# Patient Record
Sex: MALE | Race: WHITE
[De-identification: names, ages, dates, MRNs, and addresses within clinical notes are randomized per-mention and may not be internally consistent; named-entity substitution may affect disease eponyms.]

---

## 2023-09-01 ENCOUNTER — HOSPITAL ENCOUNTER (EMERGENCY)
Age: 78
Discharge: HOME | End: 2023-09-01
Payer: OTHER GOVERNMENT

## 2023-09-01 VITALS
HEART RATE: 91 BPM | DIASTOLIC BLOOD PRESSURE: 69 MMHG | OXYGEN SATURATION: 95 % | RESPIRATION RATE: 15 BRPM | SYSTOLIC BLOOD PRESSURE: 104 MMHG

## 2023-09-01 VITALS
BODY MASS INDEX: 33 KG/M2 | SYSTOLIC BLOOD PRESSURE: 133 MMHG | HEART RATE: 110 BPM | OXYGEN SATURATION: 96 % | TEMPERATURE: 97.7 F | DIASTOLIC BLOOD PRESSURE: 77 MMHG | RESPIRATION RATE: 20 BRPM

## 2023-09-01 VITALS
HEART RATE: 90 BPM | SYSTOLIC BLOOD PRESSURE: 119 MMHG | DIASTOLIC BLOOD PRESSURE: 75 MMHG | TEMPERATURE: 98.24 F | RESPIRATION RATE: 20 BRPM | OXYGEN SATURATION: 95 %

## 2023-09-01 VITALS
HEART RATE: 120 BPM | SYSTOLIC BLOOD PRESSURE: 133 MMHG | RESPIRATION RATE: 20 BRPM | OXYGEN SATURATION: 97 % | DIASTOLIC BLOOD PRESSURE: 77 MMHG

## 2023-09-01 VITALS
RESPIRATION RATE: 23 BRPM | OXYGEN SATURATION: 95 % | DIASTOLIC BLOOD PRESSURE: 74 MMHG | HEART RATE: 98 BPM | SYSTOLIC BLOOD PRESSURE: 150 MMHG

## 2023-09-01 VITALS — DIASTOLIC BLOOD PRESSURE: 66 MMHG | OXYGEN SATURATION: 95 % | HEART RATE: 95 BPM | SYSTOLIC BLOOD PRESSURE: 118 MMHG

## 2023-09-01 VITALS — HEART RATE: 110 BPM

## 2023-09-01 DIAGNOSIS — M25.511: Primary | ICD-10-CM

## 2023-09-01 DIAGNOSIS — M10.9: ICD-10-CM
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
## 2023-09-01 DIAGNOSIS — E78.00: ICD-10-CM

## 2023-09-01 DIAGNOSIS — Z87.891: ICD-10-CM

## 2023-09-01 PROCEDURE — 73070 X-RAY EXAM OF ELBOW: CPT

## 2023-09-01 PROCEDURE — 99285 EMERGENCY DEPT VISIT HI MDM: CPT

## 2023-09-01 PROCEDURE — 73060 X-RAY EXAM OF HUMERUS: CPT

## 2023-09-01 PROCEDURE — 73030 X-RAY EXAM OF SHOULDER: CPT

## 2023-09-01 PROCEDURE — 73660 X-RAY EXAM OF TOE(S): CPT

## 2023-09-01 NOTE — PC.NURSE
Rounded on pt, he reports a decrease in pain, educated we are waiting on xr reports. Pt denied any needs at this time.

## 2023-11-28 ENCOUNTER — OFFICE VISIT (OUTPATIENT)
Dept: ORTHOPEDIC SURGERY | Facility: CLINIC | Age: 78
End: 2023-11-28
Payer: OTHER GOVERNMENT

## 2023-11-28 VITALS
BODY MASS INDEX: 30.52 KG/M2 | SYSTOLIC BLOOD PRESSURE: 118 MMHG | WEIGHT: 218 LBS | HEIGHT: 71 IN | DIASTOLIC BLOOD PRESSURE: 76 MMHG

## 2023-11-28 DIAGNOSIS — M25.511 RIGHT SHOULDER PAIN, UNSPECIFIED CHRONICITY: Primary | ICD-10-CM

## 2023-11-28 PROCEDURE — 99204 OFFICE O/P NEW MOD 45 MIN: CPT | Performed by: ORTHOPAEDIC SURGERY

## 2023-11-28 RX ORDER — AMLODIPINE AND BENAZEPRIL HYDROCHLORIDE 5; 40 MG/1; MG/1
1 CAPSULE ORAL DAILY
COMMUNITY

## 2023-11-28 RX ORDER — ISOSORBIDE MONONITRATE 30 MG/1
30 TABLET, EXTENDED RELEASE ORAL DAILY
COMMUNITY

## 2023-11-28 RX ORDER — ATORVASTATIN CALCIUM 80 MG/1
80 TABLET, FILM COATED ORAL DAILY
COMMUNITY

## 2023-11-28 RX ORDER — NIFEDIPINE 30 MG/1
30 TABLET, EXTENDED RELEASE ORAL DAILY
COMMUNITY

## 2023-11-28 RX ORDER — ALLOPURINOL 100 MG/1
100 TABLET ORAL DAILY
COMMUNITY

## 2023-11-28 RX ORDER — FLUOXETINE HYDROCHLORIDE 20 MG/1
20 CAPSULE ORAL DAILY
COMMUNITY

## 2023-11-28 NOTE — PROGRESS NOTES
[facility] [place] - Musa Dowling MD    Office Visit       Patient Name: Michel Hall    Chief Complaint:   Chief Complaint   Patient presents with    Right Shoulder - Pain       Referring Physician: Rosa Carson PA  - I appreciate the referral      History of Present Illness:   Michel Hall is a 78 y.o. male who presents with right body part: shoulder Reason: pain.  Onset:Onset: mechanical fall. The issue has been ongoing for 7 month(s). Pain is a 6/10 on the pain scale. Pain is described as Pain Characterization: dull and aching. Associated symptoms include Symptoms: pain and stiffness. The pain is worse with sleeping, lying on affected side, rising from seated position, and any movement of the joint; nothing improves the pain. Previous treatments have included: steroid injection (last injection 08/2023). I have reviewed the patient's history of present illness as noted/entered above.    I have reviewed the patient's past medical history, surgical history, social history, family history, medications, and allergies as noted in the electronic medical record and as noted/entered.  I have reviewed the patient's review of systems as noted/enter and updated as noted in the patient's HPI.      RIGHT SHOULDER PAIN  Fall, worsening 7 months prior  Prior injecxtion 8/2023  Fall 6/13/2023 -- [facility] -- contralateral left shoulder xrays at that time    [facility] that [facility] could benefit from [facility] replacement he is unsure if he wants to proceed with that.  [facility] at least potentially April or May 2024.  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  Has bayonet's, uniforms, hats, helmets for multiple areas    Uses a rolling  walker    78 y.o. male  Body mass index is 30.4 kg/m².    Subjective   Subjective      Review of Systems   Constitutional: Negative.  Negative for chills, fatigue and fever.   HENT: Negative.  Negative for congestion and dental problem.    Eyes: Negative.  Negative for blurred vision.   Respiratory: Negative.  Negative for shortness of breath.    Cardiovascular: Negative.  Negative for leg swelling.   Gastrointestinal: Negative.  Negative for abdominal pain.   Endocrine: Negative.  Negative for polyuria.   Genitourinary: Negative.  Negative for difficulty urinating.   Musculoskeletal:  Positive for arthralgias.   Skin: Negative.    Allergic/Immunologic: Negative.    Neurological: Negative.    Hematological: Negative.  Negative for adenopathy.   Psychiatric/Behavioral: Negative.  Negative for behavioral problems.         Past Medical History:   Past Medical History:   Diagnosis Date    Rotator cuff syndrome April 2023       Past Surgical History:   Past Surgical History:   Procedure Laterality Date    ACHILLES TENDON SURGERY  2010    BACK SURGERY  2012    HAND SURGERY  1970       Family History: History reviewed. No pertinent family history.    Social History:   Social History     Socioeconomic History    Marital status:    Tobacco Use    Smoking status: Former     Packs/day: 1     Types: Cigarettes   Vaping Use    Vaping Use: Never used   Substance and Sexual Activity    Alcohol use: Not Currently     Alcohol/week: 10.0 standard drinks of alcohol     Types: 10 Cans of beer per week    Drug use: Never    Sexual activity: Not Currently     Partners: Female     Birth control/protection: Condom       Medications:   Current Outpatient Medications:     allopurinol (ZYLOPRIM) 100 MG tablet, Take 1 tablet by mouth Daily., Disp: , Rfl:     amLODIPine-benazepril (LOTREL) 5-40 MG per capsule, Take 1 capsule by mouth Daily., Disp: , Rfl:     atorvastatin (LIPITOR) 80 MG tablet, Take 1 tablet by mouth Daily., Disp: , Rfl:  "    FLUoxetine (PROzac) 20 MG capsule, Take 1 capsule by mouth Daily., Disp: , Rfl:     isosorbide mononitrate (IMDUR) 30 MG 24 hr tablet, Take 1 tablet by mouth Daily., Disp: , Rfl:     NIFEdipine XL (PROCARDIA XL) 30 MG 24 hr tablet, Take 1 tablet by mouth Daily., Disp: , Rfl:     Allergies:   Allergies   Allergen Reactions    Venlafaxine Other (See Comments) and Unknown (See Comments)     Other reaction(s): Other, Unknown    Lisinopril Cough and Unknown (See Comments)     Other reaction(s): Cough, Unknown       The following portions of the patient's history were reviewed and updated as appropriate: allergies, current medications, past family history, past medical history, past social history, past surgical history and problem list.        Objective    Objective      Vital Signs:   Vitals:    11/28/23 1345   BP: 118/76   Weight: 98.9 kg (218 lb)   Height: 180.3 cm (71\")       Ortho Exam:  Right shoulder-pseudoparalysis consistent with massive chronic rotator cuff tearing particularly weakness with Jobes testing, weakness with subscapularis testing.  Does have weakness with the ER but no obvious direct lag sign there.  Negative Hornblower's.  Evidence of deep brain stimulator.  He has in the axillary area-evidence of his prior near electrocution with scarring near the area of the deltopectoral interval.    Stoic is able to fire the deltoid    Results Review:   Imaging Results (Last 24 Hours)       Procedure Component Value Units Date/Time    XR Shoulder 2+ View Right [[id number]] Resulted: 11/28/23 1443     Updated: 11/28/23 1444    Narrative:      Imaging: shoulder x-rays 2 views - AP and axillary x-ray views    Side: RIGHT SHOULDER    Indication for shoulder x-ray 2 views: shoulder pain    Comparison: no comparison views available    Findings: Right shoulder 2 views reviewed.  Deep brain stimulator is noted   in the visualized field.  Evidence of chronic rotator cuff pathology are   noted radiographically with " baseline anterior subluxation and joint space   narrowing noted on the axillary image.    I personally reviewed the above x-rays.            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  Counseled on operative versus nonoperative measures-patient desires to avoid surgery and I agree that a trial of the deltoid Reading program would be very beneficial.  He is hopeful to avoid surgery.  He uses a walker or has other limitations which would make recovery from surgery difficult.    Follow Up: 2 months, no x-rays needed        Musa Dowling MD, FAAOS  Orthopedic Surgeon  Fellowship Trained Shoulder and Elbow Surgeon  [facility]  Orthopedics and Sports Medicine  [address] [place] [address], [address]  [place] [address]    11/28/23  16:24 EST